# Patient Record
Sex: MALE | Race: AMERICAN INDIAN OR ALASKA NATIVE
[De-identification: names, ages, dates, MRNs, and addresses within clinical notes are randomized per-mention and may not be internally consistent; named-entity substitution may affect disease eponyms.]

---

## 2017-05-04 ENCOUNTER — HOSPITAL ENCOUNTER (EMERGENCY)
Dept: HOSPITAL 5 - ED | Age: 47
Discharge: LEFT BEFORE DISCHARGE | End: 2017-05-04
Payer: SELF-PAY

## 2017-05-04 VITALS — SYSTOLIC BLOOD PRESSURE: 150 MMHG | DIASTOLIC BLOOD PRESSURE: 96 MMHG

## 2017-05-04 DIAGNOSIS — Z53.21: ICD-10-CM

## 2017-05-04 DIAGNOSIS — R10.9: Primary | ICD-10-CM

## 2017-05-04 LAB
ALBUMIN SERPL-MCNC: 3.8 G/DL (ref 3.9–5)
ALBUMIN/GLOB SERPL: 1.1 %
ALP SERPL-CCNC: 95 UNITS/L (ref 35–129)
ALT SERPL-CCNC: 10 UNITS/L (ref 7–56)
ANION GAP SERPL CALC-SCNC: 17 MMOL/L
BASOPHILS NFR BLD AUTO: 0.3 % (ref 0–1.8)
BILIRUB SERPL-MCNC: 0.2 MG/DL (ref 0.1–1.2)
BILIRUB UR QL STRIP: (no result)
BLOOD UR QL VISUAL: (no result)
BUN SERPL-MCNC: 14 MG/DL (ref 9–20)
BUN/CREAT SERPL: 20 %
CALCIUM SERPL-MCNC: 8.8 MG/DL (ref 8.4–10.2)
CHLORIDE SERPL-SCNC: 93.8 MMOL/L (ref 98–107)
CO2 SERPL-SCNC: 24 MMOL/L (ref 22–30)
EOSINOPHIL NFR BLD AUTO: 1.1 % (ref 0–4.3)
GLUCOSE SERPL-MCNC: 343 MG/DL (ref 75–100)
HCT VFR BLD CALC: 42.8 % (ref 35.5–45.6)
HGB BLD-MCNC: 14 GM/DL (ref 11.8–15.2)
KETONES UR STRIP-MCNC: (no result) MG/DL
LEUKOCYTE ESTERASE UR QL STRIP: (no result)
LIPASE SERPL-CCNC: 17 UNITS/L (ref 13–60)
MCH RBC QN AUTO: 30 PG (ref 28–32)
MCHC RBC AUTO-ENTMCNC: 33 % (ref 32–34)
MCV RBC AUTO: 91 FL (ref 84–94)
NITRITE UR QL STRIP: (no result)
PH UR STRIP: 5 [PH] (ref 5–7)
PLATELET # BLD: 245 K/MM3 (ref 140–440)
POTASSIUM SERPL-SCNC: 4.2 MMOL/L (ref 3.6–5)
PROT SERPL-MCNC: 7.4 G/DL (ref 6.3–8.2)
PROT UR STRIP-MCNC: (no result) MG/DL
RBC # BLD AUTO: 4.69 M/MM3 (ref 3.65–5.03)
RBC #/AREA URNS HPF: < 1 /HPF (ref 0–6)
SODIUM SERPL-SCNC: 131 MMOL/L (ref 137–145)
UROBILINOGEN UR-MCNC: < 2 MG/DL (ref ?–2)
WBC # BLD AUTO: 12.5 K/MM3 (ref 4.5–11)
WBC #/AREA URNS HPF: 2 /HPF (ref 0–6)

## 2017-05-04 PROCEDURE — 81001 URINALYSIS AUTO W/SCOPE: CPT

## 2017-05-04 PROCEDURE — 36415 COLL VENOUS BLD VENIPUNCTURE: CPT

## 2017-05-04 PROCEDURE — 83690 ASSAY OF LIPASE: CPT

## 2017-05-04 PROCEDURE — 85025 COMPLETE CBC W/AUTO DIFF WBC: CPT

## 2017-05-04 PROCEDURE — 80053 COMPREHEN METABOLIC PANEL: CPT

## 2017-05-05 NOTE — ED ELOPEMENT REVIEW
ED Pt Elopement review





- Results review


Lab results: 





 Laboratory Tests











  05/04/17 05/04/17 05/04/17





  05:50 05:50 07:11


 


WBC  12.5 H  


 


RBC  4.69  


 


Hgb  14.0  


 


Hct  42.8  


 


MCV  91  


 


MCH  30  


 


MCHC  33  


 


RDW  13.2  


 


Plt Count  245  


 


Lymph % (Auto)  15.9  


 


Mono % (Auto)  8.0 H  


 


Eos % (Auto)  1.1  


 


Baso % (Auto)  0.3  


 


Lymph #  2.0  


 


Mono #  1.0 H  


 


Eos #  0.1  


 


Baso #  0.0  


 


Seg Neutrophils %  74.7 H  


 


Seg Neutrophils #  9.4 H  


 


Sodium   131 L 


 


Potassium   4.2 


 


Chloride   93.8 L 


 


Carbon Dioxide   24 


 


Anion Gap   17 


 


BUN   14 


 


Creatinine   0.7 L 


 


Estimated GFR   > 60 


 


BUN/Creatinine Ratio   20.00 


 


Glucose   343 H 


 


Calcium   8.8 


 


Total Bilirubin   0.20 


 


AST   10 


 


ALT   10 


 


Alkaline Phosphatase   95 


 


Total Protein   7.4 


 


Albumin   3.8 L 


 


Albumin/Globulin Ratio   1.1 


 


Lipase   17 


 


Urine Color    Yellow


 


Urine Turbidity    Clear


 


Urine pH    5.0


 


Ur Specific Gravity    1.029


 


Urine Protein    <15 mg/dl


 


Urine Glucose (UA)    >=500


 


Urine Ketones    Neg


 


Urine Blood    Neg


 


Urine Nitrite    Neg


 


Urine Bilirubin    Neg


 


Urine Urobilinogen    < 2.0


 


Ur Leukocyte Esterase    Neg


 


Urine WBC (Auto)    2.0


 


Urine RBC (Auto)    < 1.0


 


U Epithel Cells (Auto)    1.0


 


Urine Mucus    Few














- Call Back decision


Pt Call Back Decision: No action required

## 2017-10-04 ENCOUNTER — HOSPITAL ENCOUNTER (EMERGENCY)
Dept: HOSPITAL 5 - ED | Age: 47
LOS: 2 days | Discharge: HOME | End: 2017-10-06
Payer: OTHER GOVERNMENT

## 2017-10-04 DIAGNOSIS — I10: ICD-10-CM

## 2017-10-04 DIAGNOSIS — F39: Primary | ICD-10-CM

## 2017-10-04 DIAGNOSIS — E11.9: ICD-10-CM

## 2017-10-04 DIAGNOSIS — F43.10: ICD-10-CM

## 2017-10-04 DIAGNOSIS — F17.200: ICD-10-CM

## 2017-10-04 DIAGNOSIS — M19.90: ICD-10-CM

## 2017-10-04 LAB
ALBUMIN SERPL-MCNC: 3.8 G/DL (ref 3.9–5)
ALBUMIN SERPL-MCNC: 3.9 G/DL (ref 3.9–5)
ALBUMIN/GLOB SERPL: 1.1 %
ALBUMIN/GLOB SERPL: 1.1 %
ALP SERPL-CCNC: 73 UNITS/L (ref 35–129)
ALP SERPL-CCNC: 73 UNITS/L (ref 35–129)
ALT SERPL-CCNC: 6 UNITS/L (ref 7–56)
ALT SERPL-CCNC: 7 UNITS/L (ref 7–56)
ANION GAP SERPL CALC-SCNC: 19 MMOL/L
ANION GAP SERPL CALC-SCNC: 21 MMOL/L
APTT BLD: 22.9 SEC. (ref 24.2–36.6)
BACTERIA #/AREA URNS HPF: (no result) /HPF
BASOPHILS NFR BLD AUTO: 0.7 % (ref 0–1.8)
BILIRUB SERPL-MCNC: 0.3 MG/DL (ref 0.1–1.2)
BILIRUB SERPL-MCNC: 0.4 MG/DL (ref 0.1–1.2)
BILIRUB UR QL STRIP: (no result)
BLOOD UR QL VISUAL: (no result)
BUN SERPL-MCNC: 6 MG/DL (ref 9–20)
BUN SERPL-MCNC: 6 MG/DL (ref 9–20)
BUN/CREAT SERPL: 7 %
BUN/CREAT SERPL: 8 %
CALCIUM SERPL-MCNC: 8.9 MG/DL (ref 8.4–10.2)
CALCIUM SERPL-MCNC: 9.1 MG/DL (ref 8.4–10.2)
CHLORIDE SERPL-SCNC: 101.8 MMOL/L (ref 98–107)
CHLORIDE SERPL-SCNC: 102.3 MMOL/L (ref 98–107)
CK SERPL-CCNC: 180 UNITS/L (ref 55–170)
CO2 SERPL-SCNC: 21 MMOL/L (ref 22–30)
CO2 SERPL-SCNC: 24 MMOL/L (ref 22–30)
EOSINOPHIL NFR BLD AUTO: 3.9 % (ref 0–4.3)
GLUCOSE SERPL-MCNC: 165 MG/DL (ref 75–100)
GLUCOSE SERPL-MCNC: 166 MG/DL (ref 75–100)
HCT VFR BLD CALC: 39.4 % (ref 35.5–45.6)
HGB BLD-MCNC: 13 GM/DL (ref 11.8–15.2)
INR PPP: 1 (ref 0.87–1.13)
KETONES UR STRIP-MCNC: (no result) MG/DL
LEUKOCYTE ESTERASE UR QL STRIP: (no result)
MAGNESIUM SERPL-MCNC: 2.4 MG/DL (ref 1.7–2.3)
MCH RBC QN AUTO: 29 PG (ref 28–32)
MCHC RBC AUTO-ENTMCNC: 33 % (ref 32–34)
MCV RBC AUTO: 89 FL (ref 84–94)
MUCOUS THREADS #/AREA URNS HPF: (no result) /HPF
NITRITE UR QL STRIP: (no result)
PH UR STRIP: 6 [PH] (ref 5–7)
PLATELET # BLD: 351 K/MM3 (ref 140–440)
POTASSIUM SERPL-SCNC: 3.8 MMOL/L (ref 3.6–5)
POTASSIUM SERPL-SCNC: 3.8 MMOL/L (ref 3.6–5)
PROT SERPL-MCNC: 7.3 G/DL (ref 6.3–8.2)
PROT SERPL-MCNC: 7.3 G/DL (ref 6.3–8.2)
PROT UR STRIP-MCNC: (no result) MG/DL
RBC # BLD AUTO: 4.43 M/MM3 (ref 3.65–5.03)
RBC #/AREA URNS HPF: 1 /HPF (ref 0–6)
SODIUM SERPL-SCNC: 140 MMOL/L (ref 137–145)
SODIUM SERPL-SCNC: 141 MMOL/L (ref 137–145)
URINE DRUGS OF ABUSE NOTE: (no result)
UROBILINOGEN UR-MCNC: < 2 MG/DL (ref ?–2)
WBC # BLD AUTO: 6.8 K/MM3 (ref 4.5–11)
WBC #/AREA URNS HPF: 3 /HPF (ref 0–6)

## 2017-10-04 PROCEDURE — 84484 ASSAY OF TROPONIN QUANT: CPT

## 2017-10-04 PROCEDURE — 85610 PROTHROMBIN TIME: CPT

## 2017-10-04 PROCEDURE — 74177 CT ABD & PELVIS W/CONTRAST: CPT

## 2017-10-04 PROCEDURE — 82140 ASSAY OF AMMONIA: CPT

## 2017-10-04 PROCEDURE — 80053 COMPREHEN METABOLIC PANEL: CPT

## 2017-10-04 PROCEDURE — 72125 CT NECK SPINE W/O DYE: CPT

## 2017-10-04 PROCEDURE — 96360 HYDRATION IV INFUSION INIT: CPT

## 2017-10-04 PROCEDURE — 85025 COMPLETE CBC W/AUTO DIFF WBC: CPT

## 2017-10-04 PROCEDURE — 36415 COLL VENOUS BLD VENIPUNCTURE: CPT

## 2017-10-04 PROCEDURE — 80320 DRUG SCREEN QUANTALCOHOLS: CPT

## 2017-10-04 PROCEDURE — 85730 THROMBOPLASTIN TIME PARTIAL: CPT

## 2017-10-04 PROCEDURE — 70450 CT HEAD/BRAIN W/O DYE: CPT

## 2017-10-04 PROCEDURE — 82962 GLUCOSE BLOOD TEST: CPT

## 2017-10-04 PROCEDURE — 81001 URINALYSIS AUTO W/SCOPE: CPT

## 2017-10-04 PROCEDURE — 82550 ASSAY OF CK (CPK): CPT

## 2017-10-04 PROCEDURE — 71010: CPT

## 2017-10-04 PROCEDURE — 99285 EMERGENCY DEPT VISIT HI MDM: CPT

## 2017-10-04 PROCEDURE — 93005 ELECTROCARDIOGRAM TRACING: CPT

## 2017-10-04 PROCEDURE — 93010 ELECTROCARDIOGRAM REPORT: CPT

## 2017-10-04 PROCEDURE — 80307 DRUG TEST PRSMV CHEM ANLYZR: CPT

## 2017-10-04 PROCEDURE — 74176 CT ABD & PELVIS W/O CONTRAST: CPT

## 2017-10-04 PROCEDURE — 83735 ASSAY OF MAGNESIUM: CPT

## 2017-10-04 PROCEDURE — G0480 DRUG TEST DEF 1-7 CLASSES: HCPCS

## 2017-10-04 PROCEDURE — 84443 ASSAY THYROID STIM HORMONE: CPT

## 2017-10-04 NOTE — CAT SCAN REPORT
CT of cervical spine without contrast.



History: Neck pain after trauma.



Findings: There is no evidence of fracture or subluxation. Multilevel 

spondylosis is present with extensive anterior osteophytes from C4-C7. 

The posterior elements are normal. The odontoid is intact.



Impression: No acute findings.

## 2017-10-04 NOTE — CAT SCAN REPORT
CT of the abdomen and pelvis without contrast.



History: Abdominal trauma.



Findings: The technical quality of the examination especially regarding 

the upper abdominal viscera is suboptimal due to metallic streak 

artifact from an external metallic device in the patient's anterior 

skin surface. Noting these limitations, no definite abnormalities of 

the liver or spleen are seen. No subcapsular fluid collection is 

identified. The pancreas is unremarkable. There is a large left 

intrarenal stone centrally measuring 1.6 cm in maximum dimension. There 

is no evidence of hydronephrosis. There is a hypodense mass arising 

from the lateral aspect of the left kidney measuring 3.2 cm in 

diameter. This may represent a simple cyst but is difficult to assess 

accurately without intravenous contrast and due to the streak artifacts 

described above.



There is no evidence of retroperitoneal hemorrhage. Surgical clips are 

seen in the patient's midline.



No pelvic masses or abnormal fluid collections are seen. Thickening of 

Gerota's fascia is seen on the left. Coalescent density in the left 

paracolic gutter may represent fluid filled bowel. A Small fluid 

collection in this area cannot entirely be excluded.



No free air is identified. No pelvic masses are seen.



Impression: 1. Large left renal stone without hydronephrosis.



2. Hypodense left renal mass probably representing a cyst. Please see 

above described technical limitations.



3. Mild thickening of left Gerota's fascia with coalescent density in 

the left paracolic gutter. This are present fluid-filled bowel or a 

small fluid collection.

## 2017-10-04 NOTE — EMERGENCY DEPARTMENT REPORT
Blank Doc





- Documentation


Documentation: 





I was asked to follow up on this patient's repeat CT scan of the abdomen and 

pelvis, this time with by mouth and IV contrast, and hopes of medically 

clearing this patient.  The CT report was read as left mild degree paracolic 

gutter fluid and that loculation cannot be ruled out.  However the patient does 

not complain of any abdominal pain.  There is no sign of any surrounding 

erythema or purulent discharge around the incision site where he has staples.  

Vital signs are stable including being afebrile.  There is no leukocytosis.  I 

spoke to Dr. Jeff, the partner of Dr. Seals who did the colostomy reversal 

on 9/20/17 at [facility].  Dr. Jeff says that it is not unusual for 

there to be some residual fluid in that paracolic gutter region as that is the 

area where it would accumulate, where it is a drainage site and secondary to 

the materials they used for the surgery.  He says that in absence of symptoms 

and with the normal-appearing labs and vitals, that he is not concerned for any 

infection or postop complication.  At this point, based on the rest of the 

evaluation done by Dr. Ross, and as there is nothing else that I was asked 

to follow up on regarding medical clearance, the patient appears to be 

medically cleared for psychiatric placement.  The 1013 was previously filled 

out by Dr Ross.

## 2017-10-04 NOTE — CAT SCAN REPORT
CRANIAL CT SCAN:



History: Altered mental status.

Serial contiguous axial images were obtained through the cranium.

Intravenous contrast material was not administered.  The ventricles are 

normal in size and appearance.  There is no mass effect or midline 

shift.  No areas of abnormally increased or decreased attenuation are 

seen.  No mass lesion is seen.



The mastoid air cells and visualized portions of the sinuses are

normal.



IMPRESSION:

Cranial CT scan within normal limits.

## 2017-10-04 NOTE — EMERGENCY DEPARTMENT REPORT
ED General Adult HPI





- General


Chief complaint: Altered Mental Status


Stated complaint: AMS


Time Seen by Provider: 10/04/17 13:28


Source: EMS (verbal report received from EMS.ems notes not available at time of

  chart dictation), RN notes reviewed


Limitations: Altered Mental Status, Physical Limitation





- History of Present Illness


Initial comments: 





This is a 47-year-old male, the patient is previously unknown to this provider.

  EMS reports a past medical history of PTSD.  They report that the patient 

[facility]; they believe that 

the patient had a colectomy/colostomy bag removal.





EMS indicates that they were contacted for agitated behavior.  They indicate 

that the patient was quite combative, and required many people to restrain and 

hold him down in the field.  They verbally reported that they gave Benadryl, 

Versed, and Haldol prior to arrival.  Upon arrival to the ER, the patient 

sedated, is arousable, protecting his airway, and in no acute distress.  Family 

is unavailable at this time for collateral information.


Unable to ascertain information about exacerbating or relieving factors, 

radiation, etc.











-: unknown


Radiation: other (as per history of present illness)


Consistency: other (as per history of present illness)


Improves with: other (as per history of present illness)


Worsens with: other (as per history of present illness)


Associated Symptoms: other (as per history of present illness)





- Related Data


 Home Medications











 Medication  Instructions  Recorded  Confirmed  Last Taken


 


Sertraline 200 mg PO DAILY 05/04/17 05/04/17 Unknown


 


amLODIPine 10 mg PO DAILY 05/04/17 05/04/17 Unknown


 


glipiZIDE 5 mg PO TID 05/04/17 05/04/17 Unknown


 


metFORMIN 1,000 mg PO TID 05/04/17 05/04/17 Unknown











 Allergies











Allergy/AdvReac Type Severity Reaction Status Date / Time


 


No Known Allergies Allergy   Unverified 05/04/17 05:29














ED Review of Systems


ROS: 


Stated complaint: AMS


Other details as noted in HPI





Comment: Unobtainable due to pts medical conditions





ED Past Medical Hx





- Past Medical History


Hx Hypertension: Yes


Hx Diabetes: Yes


Hx Arthritis: Yes


Additional medical history: PTSD,





- Surgical History


Additional Surgical History: Right Ankle. Right hip.





- Social History


Smoking Status: Current Every Day Smoker


Substance Use Type: None





- Medications


Home Medications: 


 Home Medications











 Medication  Instructions  Recorded  Confirmed  Last Taken  Type


 


Sertraline 200 mg PO DAILY 05/04/17 05/04/17 Unknown History


 


amLODIPine 10 mg PO DAILY 05/04/17 05/04/17 Unknown History


 


glipiZIDE 5 mg PO TID 05/04/17 05/04/17 Unknown History


 


metFORMIN 1,000 mg PO TID 05/04/17 05/04/17 Unknown History














ED Physical Exam





- General


Limitations: Altered Mental Status, Physical Limitation


General appearance: lethargic





- Head


Head exam: Present: atraumatic, normocephalic





- Eye


Eye exam: Present: normal appearance, PERRL





- ENT


ENT exam: Present: normal exam, normal orophraynx, mucous membranes moist, TM's 

normal bilaterally, normal external ear exam, other (there is no hemotympanum)





- Neck


Neck exam: Present: normal inspection.  Absent: tenderness, meningismus





- Respiratory


Respiratory exam: Present: normal lung sounds bilaterally.  Absent: respiratory 

distress, wheezes, rales, rhonchi, stridor, chest wall tenderness, accessory 

muscle use, decreased breath sounds, prolonged expiratory





- Cardiovascular


Cardiovascular Exam: Present: normal rhythm, tachycardia, normal heart sounds.  

Absent: systolic murmur, diastolic murmur, rubs, gallop





- GI/Abdominal


GI/Abdominal exam: Present: soft, normal bowel sounds, other (multiple staples 

noted on the abdominal wall.  No redness, pus or streaking.  There is no 

tenderness).  Absent: distended, tenderness, guarding, rebound, rigid, 

pulsatile mass





- Rectal


Rectal exam: Present: normal inspection





- 


 exam: Present: normal inspection


External exam: Present: normal external exam





- Extremities Exam


Extremities exam: Present: normal inspection, normal capillary refill, other (

there is no clonus.  The compartments are soft.  There is no long bony step-

offs.  2+ pulses noted in the bilateral upper and lower extremities).  Absent: 

calf tenderness





- Back Exam


Back exam: Present: normal inspection.  Absent: tenderness, CVA tenderness (R), 

paraspinal tenderness





- Neurological Exam


Neurological exam: Present: altered





- Psychiatric


Psychiatric exam: Present: other (patient is currently nonverbal)





- Skin


Skin exam: Present: warm, dry, intact, normal color.  Absent: rash





ED Course


 Vital Signs











  10/04/17 10/04/17





  13:36 14:08


 


Temperature 99.3 F 


 


Pulse Rate 103 H 


 


Respiratory 18 18





Rate  


 


Blood Pressure 142/86 


 


O2 Sat by Pulse 99 100





Oximetry  














- Reevaluation(s)


Reevaluation #1: 





10/04/17 13:49


Differential diagnosis: Intracranial injury, cervical spine injury, mood 

disorder, electrolyte derangement, intra-abdominal injury





Assessment and plan: 47-year-old male who is currently sedated after episode of 

agitated behavior.





He does not have hyperreflexia, there is no clonus, and he does not have a 

fever.


We will check CT scan of the brain and cervical spine, laboratory studies, 

urinalysis, chest x-ray, noncontrast CT scan of the abdomen and pelvis, and 

wait for the patient to wake up in order to obtain better history.


Reevaluation #2: 





10/04/17 15:12


Additional history obtained from patient's fiance, Miss Fay Shah; [phone]





She reports that the patient was quite agitated earlier on today, and is 

currently dependent on "rocks", crack and cocaine.  Apparently, the patient 

endorses suicidality, and was quite agitated and belligerent, and this is why


911 was contacted.





She reports that the patient had a distant history of diverticulitis, which 

required colostomy, which was reversed 2 weeks ago at [facility], by Dr. Seals.





Patient is placed on a 1013 at this time.  A crisis consult has been ordered/

requested.


Reevaluation #3: 





10/04/17 15:26


Noncontrast CT scan of the brain and cervical spine negative.  CT scan of the 

abdomen/pelvis with nonspecific findings.  Patient is still sleepy, plan to 

repeat with IV and oral contrast.  If abnormal findings persist on repeat CT 

scan with better delineation, will discuss with the patient's surgeon at 

[facility], Dr. Seals.








[facility] Surgeon   Teodoro Seals M.D.


[facility]   


Appendectomy, Breast Surgery, Cancer Surgery, Colon Surgery, Gallbladder Surgery

, Hernia Surgery, Laparoscopic Surgery, Melanoma Surgery, Parathyroid Surgery, 

Rectal Surgery, Reflux Surgery, Spleen Surgery, Stomach Surgery, Thyroid Surgery





Board Certification   August 2013


Hospitals   [facility] 7/2008-8/2013


Practice Name   [facility]


Phone   [phone]  Fax [phone]


Office(s)   


[facility] [address]


10/04/17 15:28





Reevaluation #4: 





10/04/17 15:43


Care transferred to the oncoming ER physician, Dr. Snow, to follow-up CT scan 

of the abdomen and pelvis with IV and oral contrast.  Patient still somnolent 

and sedated, therefore he cannot tolerate oral contrast, we will have to wait 

for him to wake up.





ED Medical Decision Making





- Lab Data


Result diagrams: 


 10/04/17 13:57





 10/04/17 13:57








 Vital Signs











  10/04/17





  13:36


 


Temperature 99.3 F


 


Pulse Rate 103 H


 


Respiratory 18





Rate 


 


Blood Pressure 142/86


 


O2 Sat by Pulse 99





Oximetry 














- EKG Data





10/04/17 13:51


Sinus tachycardia, 104 bpm, left axis deviation, atrial enlargement, not 

morphologically consistent with STEMI, QTc prolonged period there is no prior 

for comparison at this time.





- Radiology Data


Radiology results: pending


Critical care attestation.: 


If time is entered above; I have spent that time in minutes in the direct care 

of this critically ill patient, excluding procedure time.








ED Disposition


Clinical Impression: 


 Mood disorder





Disposition: DC/TX-[id number] PSY HOSP/PSY UNIT


Is pt being admited?: No


Does the pt Need Aspirin: No


Condition: Good


Referrals: 


PRIMARY CARE,MD [Primary Care Provider] - 3-5 Days

## 2017-10-04 NOTE — XRAY REPORT
AP CHEST:



HISTORY: Altered mental status



AP view of the chest demonstrates a normal mediastinal and

cardiac contour with clear lungs and normal bony and soft tissue

structures.



IMPRESSION:

Unremarkable AP chest.

## 2017-10-04 NOTE — CAT SCAN REPORT
FINAL REPORT



PROCEDURE:  CT ABDOMEN PELVIS W CON



TECHNIQUE:  Computerized axial tomography of the abdomen and

pelvis was performed after the IV injection of iodinated nonionic

contrast. 



HISTORY:  iv and oral 



COMPARISON:  10/04/2017



FINDINGS:  

Liver, spleen, and adrenal glands are within normal limits. There

are multiple simple cysts involving bilateral kidneys largest

measuring 2.4 x 3.3 centimeters located in the midpole left

kidney. 8 millimeter x 13 millimeter nonobstructive calculus is

noted in the left lower collecting system. 2.2 x 2.8 centimeter

septated cystic lesion involving the lower pole right kidney

demonstrates a ill-defined areas of enhancement. In a similar

fashion a 1.3 centimeters cystic lesion of the midpole right

kidney demonstrates nodular enhancement along medial aspect.

There is no hydronephrosis. Urinary bladder is well distended

with normal outlines. There is no free air. Gallbladder is

unremarkable. Aorta is of normal caliber. Small bowel loops are

within normal limits. There is evidence of prior colonic surgery.

Mild degree fluid is noted in the left paracolic gutter.

Extensive degenerative changes are noted involving bilateral hip

joints with deformities of femoral heads and acetabula. 



IMPRESSION:  

Partially enhancing cystic lesions are noted in the right kidney.

Right renal neoplasms cannot be excluded. Biopsy is recommended.



Nonobstructive left renal calculus.



Mild degree left paracolic gutter fluid. Loculations of this

fluid cannot be excluded.

## 2017-10-05 VITALS — DIASTOLIC BLOOD PRESSURE: 88 MMHG | SYSTOLIC BLOOD PRESSURE: 143 MMHG

## 2017-10-05 RX ADMIN — AMLODIPINE BESYLATE SCH: 10 TABLET ORAL at 16:08

## 2017-10-05 RX ADMIN — SERTRALINE SCH MG: 100 TABLET, FILM COATED ORAL at 16:08

## 2017-10-06 RX ADMIN — AMLODIPINE BESYLATE SCH MG: 10 TABLET ORAL at 10:35

## 2017-10-06 RX ADMIN — SERTRALINE SCH MG: 100 TABLET, FILM COATED ORAL at 10:35

## 2017-10-06 NOTE — CONSULTATION
History of Present Illness





- Reason for Consult


Consult date: 10/06/17


Reason for consult: Mental Health Evaluation


Requesting physician: NANO MOORE





- Chief Complaint


Chief complaint: 


"I was upset"








- History of Present Psychiatric Illness


47 y.o AA male presenting to [facility] for agitated behavior. Today patient is calm 

and cooperative during the assessment. He stated that he got upset with his 

girlfriend and became irritated, so he left the house and cannot remember 

anything else. He did state that he started drinking alcohol once he left his 

home. He stated that he been drinking alcohol over 20+ years. He stated that he 

drink (etoh) everyday. Per the patient, this is the first time in years that he 

have not had a drink in 2 straight days. He stated that he was "drunk" when he 

arrived at this facility. He stated experiencing "blacking out" from excessive 

drinking. He stated that he drink to mask his depression. He stated that he 

suffer from PTSD, because of trauma like experiences while in the . He 

stated that he struggle with feeling sad, hopeless, and helpless, but can't 

explain why. He denies SI/HI's and AVH's. He rate his depression 5/10, with 10 

being the worse. He reports sleep disturbances prior to his admission, but 

denies a poor appetite. He denies recreational drug use, but positive for 

cocaine. Also, patient positive for benzos. He stated that his major issue is 

alcohol addiction. The patient is willing to attend rehab services once 

discharged. He stated not being compliant with his medication for 2 weeks prior 

to his admission to [facility]. 








Medications and Allergies


 Allergies











Allergy/AdvReac Type Severity Reaction Status Date / Time


 


No Known Allergies Allergy   Unverified 05/04/17 05:29











 Home Medications











 Medication  Instructions  Recorded  Confirmed  Last Taken  Type


 


Gabapentin [Neurontin] 400 mg PO QHS 10/04/17 10/04/17 Unknown History


 


Metformin HCl [Metformin HCl ER] 1,000 mg PO BID 10/04/17 10/04/17 Unknown 

History


 


Mirtazapine [Remeron] 30 mg PO QHS 10/04/17 10/04/17 Unknown History


 


Morphine ER [Ms Contin ER] 30 mg PO Q12H 10/04/17 10/04/17 Unknown History


 


Oxycodone HCl [oxyCODONE TAB] 10 mg PO Q4H PRN 10/04/17 10/04/17 Unknown History


 


Sertraline [Zoloft] 200 mg PO QDAY 10/04/17 10/04/17 Unknown History


 


amLODIPine [Norvasc] 10 mg PO QDAY 10/04/17 10/04/17 Unknown History


 


glipiZIDE [Glucotrol] 5 mg PO QDAY 10/04/17 10/04/17 Unknown History


 


traZODone [Desyrel] 200 mg PO QHS 10/04/17 10/04/17 Unknown History











Active Meds: 


Active Medications





Acetaminophen (Tylenol)  650 mg PO Q6HR PRN


   PRN Reason: Pain


Amlodipine Besylate (Norvasc)  10 mg PO QDAY [facility]


   Last Admin: 10/06/17 10:35 Dose:  10 mg


Gabapentin (Neurontin)  400 mg PO QHS [facility]


   Last Admin: 10/05/17 22:05 Dose:  400 mg


Haloperidol Lactate (Haldol)  5 mg IM Q6HR PRN


   PRN Reason: Agitation


Ibuprofen (Motrin)  600 mg PO Q6HR PRN


   PRN Reason: Pain


Lorazepam (Ativan)  2 mg IM Q4HR PRN


   PRN Reason: Agitation


Mirtazapine (Remeron)  30 mg PO QHS [facility]


   Last Admin: 10/05/17 22:05 Dose:  30 mg


Ondansetron HCl (Zofran Odt)  4 mg PO Q6HR PRN


   PRN Reason: Nausea


Sertraline HCl (Zoloft)  200 mg PO QDAY [facility]


   Last Admin: 10/06/17 10:35 Dose:  200 mg


Trazodone HCl (Desyrel)  200 mg PO QHS [facility]


   Last Admin: 10/05/17 22:05 Dose:  200 mg











Past psychiatric history





- Past Medical History


Past Medical History: hypertension


Past Surgical History: Other (Abdomen Surgery)





- past Psychiatric treatment and history


Psych: Depression


psychiatric treatment history: 


[facility]. He 

denies a fam psy hx.








- Social History


Social history: other ([employer] Graduate)





Mental Status Exam





- Vital signs


 Last Vital Signs











Temp  98.4 F   10/05/17 20:11


 


Pulse  87   10/06/17 10:35


 


Resp  18   10/06/17 12:45


 


BP  143/88   10/06/17 10:35


 


Pulse Ox  99   10/06/17 12:45














- Exam


Narrative exam: 


MSE:





 Appearance: calm, cooperative


 Behavior: regular eye contact


 Speech: regular rate and tone


 Mood: "I am okay"


 Affect: congruent to mood


 Thought Process: linear


 Thought Content: denies SI/HI's and AVH's


 Motor Activity: ambulatory


 Cognition: A/O x 3


 Insight: fair


 Judgment: fair








Results


Result Diagrams: 


 10/04/17 13:57





 10/04/17 13:57


 Abnormal lab results











  10/05/17 Range/Units





  11:02 


 


POC Glucose  265 H  ()  








All other labs normal.








Assessment and Plan


Assessment and plan: 


Impression: Historical Dx: Depression/PTSD. Unspecified Mood DO. Alcohol Use 

DO. Substance Use DO (cocaine). Today patient is calm and cooperative during 

the assessment. Positive for cocaine. Patient is no threat to others.





DDx: R/O MDD, R/O Bipolar, Alcohol Induced Mood DO





Recommendation/Plan: Rescind 1013. Continue Zoloft 200 mg PO Daily for 

depression and Remeron 15 mg PO HS for sleep consolidation. Patient stated that 

[facility]. Also, 

patient given outpatient/rehab services for [facility]. Discussed possible 

suicidality/medication induced suyapa with patient reference antidepressants.

## 2020-07-21 ENCOUNTER — HOSPITAL ENCOUNTER (EMERGENCY)
Dept: HOSPITAL 5 - ED | Age: 50
Discharge: HOME | End: 2020-07-21
Payer: OTHER GOVERNMENT

## 2020-07-21 VITALS — DIASTOLIC BLOOD PRESSURE: 82 MMHG | SYSTOLIC BLOOD PRESSURE: 132 MMHG

## 2020-07-21 DIAGNOSIS — F17.200: ICD-10-CM

## 2020-07-21 DIAGNOSIS — Z79.899: ICD-10-CM

## 2020-07-21 DIAGNOSIS — R07.89: ICD-10-CM

## 2020-07-21 DIAGNOSIS — E11.65: Primary | ICD-10-CM

## 2020-07-21 DIAGNOSIS — I10: ICD-10-CM

## 2020-07-21 DIAGNOSIS — F43.10: ICD-10-CM

## 2020-07-21 DIAGNOSIS — M19.90: ICD-10-CM

## 2020-07-21 DIAGNOSIS — F99: ICD-10-CM

## 2020-07-21 LAB
ALBUMIN SERPL-MCNC: 3.8 G/DL (ref 3.9–5)
ALT SERPL-CCNC: 13 UNITS/L (ref 7–56)
APTT BLD: 24.3 SEC. (ref 24.2–36.6)
BASOPHILS # (AUTO): 0 K/MM3 (ref 0–0.1)
BASOPHILS NFR BLD AUTO: 0.5 % (ref 0–1.8)
BILIRUB DIRECT SERPL-MCNC: < 0.2 MG/DL (ref 0–0.2)
BUN SERPL-MCNC: 8 MG/DL (ref 9–20)
BUN/CREAT SERPL: 9 %
CALCIUM SERPL-MCNC: 8.8 MG/DL (ref 8.4–10.2)
EOSINOPHIL # BLD AUTO: 0.1 K/MM3 (ref 0–0.4)
EOSINOPHIL NFR BLD AUTO: 2 % (ref 0–4.3)
HCT VFR BLD CALC: 45.2 % (ref 35.5–45.6)
HEMOLYSIS INDEX: 2
HGB BLD-MCNC: 15 GM/DL (ref 11.8–15.2)
INR PPP: 0.9 (ref 0.87–1.13)
LYMPHOCYTES # BLD AUTO: 1.6 K/MM3 (ref 1.2–5.4)
LYMPHOCYTES NFR BLD AUTO: 30.5 % (ref 13.4–35)
MCHC RBC AUTO-ENTMCNC: 33 % (ref 32–34)
MCV RBC AUTO: 97 FL (ref 84–94)
MONOCYTES # (AUTO): 0.5 K/MM3 (ref 0–0.8)
MONOCYTES % (AUTO): 8.9 % (ref 0–7.3)
PLATELET # BLD: 152 K/MM3 (ref 140–440)
RBC # BLD AUTO: 4.68 M/MM3 (ref 3.65–5.03)

## 2020-07-21 PROCEDURE — 36415 COLL VENOUS BLD VENIPUNCTURE: CPT

## 2020-07-21 PROCEDURE — 85025 COMPLETE CBC W/AUTO DIFF WBC: CPT

## 2020-07-21 PROCEDURE — 80076 HEPATIC FUNCTION PANEL: CPT

## 2020-07-21 PROCEDURE — 80048 BASIC METABOLIC PNL TOTAL CA: CPT

## 2020-07-21 PROCEDURE — 71045 X-RAY EXAM CHEST 1 VIEW: CPT

## 2020-07-21 PROCEDURE — 96361 HYDRATE IV INFUSION ADD-ON: CPT

## 2020-07-21 PROCEDURE — 93005 ELECTROCARDIOGRAM TRACING: CPT

## 2020-07-21 PROCEDURE — 82962 GLUCOSE BLOOD TEST: CPT

## 2020-07-21 PROCEDURE — 96374 THER/PROPH/DIAG INJ IV PUSH: CPT

## 2020-07-21 PROCEDURE — 83735 ASSAY OF MAGNESIUM: CPT

## 2020-07-21 PROCEDURE — 85730 THROMBOPLASTIN TIME PARTIAL: CPT

## 2020-07-21 PROCEDURE — 96372 THER/PROPH/DIAG INJ SC/IM: CPT

## 2020-07-21 PROCEDURE — 82010 KETONE BODYS QUAN: CPT

## 2020-07-21 PROCEDURE — 83880 ASSAY OF NATRIURETIC PEPTIDE: CPT

## 2020-07-21 PROCEDURE — 84484 ASSAY OF TROPONIN QUANT: CPT

## 2020-07-21 PROCEDURE — 85610 PROTHROMBIN TIME: CPT

## 2020-07-21 PROCEDURE — 99284 EMERGENCY DEPT VISIT MOD MDM: CPT

## 2020-07-21 PROCEDURE — 82553 CREATINE MB FRACTION: CPT

## 2020-07-21 PROCEDURE — 82550 ASSAY OF CK (CPK): CPT

## 2020-07-21 PROCEDURE — 82805 BLOOD GASES W/O2 SATURATION: CPT

## 2020-07-21 NOTE — XRAY REPORT
CHEST 1 VIEW 7/21/2020 1:29 PM



INDICATION / CLINICAL INFORMATION:

Chest Pain.



COMPARISON: 

One view of the chest from 10/4/2017.



FINDINGS:



SUPPORT DEVICES: None.

HEART / MEDIASTINUM: No significant abnormality. 

LUNGS / PLEURA: No significant pulmonary or pleural abnormality. No pneumothorax. 



ADDITIONAL FINDINGS: No significant additional findings.



IMPRESSION:

1. No acute abnormality of the chest.



Signer Name: Butch Perez MD 

Signed: 7/21/2020 1:58 PM

Workstation Name: [id number]

## 2020-07-21 NOTE — EMERGENCY DEPARTMENT REPORT
ED Chest Pain HPI





- General


Chief Complaint: Dyspnea/Respdistress


Stated Complaint: PUI/HYPERGLYCEMIA


Time Seen by Provider: 07/21/20 13:02


Source: EMS


Mode of arrival: Stretcher


Limitations: No Limitations





- History of Present Illness


Initial Comments: 


This is a 50-year-old man who states that his sugars have been out of control.  

He tells me that he called the ambulance because of chest pain and shortness of 

breath.  He stated the chest pain was of less than 1 minute duration.  He states

that he used to be on Lantus at night but he is now taking another medication of

unknown name.  He receives care at the [facility].  He does admit to having a p

sychiatric disorder.  Per review of his previous medical record in 2017:


Assessment and Plan


Assessment and plan: 


Impression: Historical Dx: Depression/PTSD. Unspecified Mood DO. Alcohol Use DO.

Substance Use DO (cocaine). Today patient is calm and cooperative during the 

assessment. Positive for cocaine. Patient is no threat to others.


Despite stating that he call EMS because of chest pain, there is no record of a 

prehospital 12-lead EKG or report from EMS.  The patient states that he has been

admitted to the [facility] within 6 months for evaluation of chest pain having 

studies done as an inpatient which were negative for a heart problem.  He also 

states he has had several stress tests in the past that were negative.  He has a

very strange affect and approach.  He immediately asked me where I am from a 

symptoms I come in the room.  When I told him [place], he was reassured and 

continue to provide historical information.  Medication reconciliation indicates

previous prescription for MS Contin in 2017.


MD Complaint: chest pain


-: minutes(s) (Less than a minute)


Severity scale (0 -10): 9





- Related Data


                                Home Medications











 Medication  Instructions  Recorded  Confirmed  Last Taken


 


Gabapentin 400 mg PO QHS 10/04/17 10/04/17 Unknown


 


Metformin HCl [Metformin HCl ER] 1,000 mg PO BID 10/04/17 10/04/17 Unknown


 


Mirtazapine [Remeron] 30 mg PO QHS 10/04/17 10/04/17 Unknown


 


Morphine ER [Ms Contin ER] 30 mg PO Q12H 10/04/17 10/04/17 Unknown


 


Oxycodone HCl [oxyCODONE] 10 mg PO Q4H PRN 10/04/17 10/04/17 Unknown


 


Sertraline [Zoloft] 200 mg PO QDAY 10/04/17 10/04/17 Unknown


 


amLODIPine 10 mg PO QDAY 10/04/17 10/04/17 Unknown


 


glipiZIDE [Glucotrol] 5 mg PO QDAY 10/04/17 10/04/17 Unknown


 


traZODone [Desyrel] 200 mg PO QHS 10/04/17 10/04/17 Unknown








                                  Previous Rx's











 Medication  Instructions  Recorded  Last Taken  Type


 


Insulin Regular, Human [HumuLIN R] 1 units SQ BID #1 vial 07/21/20 Unknown Rx











                                    Allergies











Allergy/AdvReac Type Severity Reaction Status Date / Time


 


No Known Allergies Allergy   Unverified 05/04/17 05:29














Heart Score





- HEART Score


History: Slightly suspicious


EKG: Non-specific


Age: 45-65


Risk factors: > 3 risk factors or hx of atherosclerotic disease


Troponin: < normal limit


HEART Score: 4





- Critical Actions


Critical Actions: 4-6 pts:12-16.6% risk of adverse cardiac event. Should be 

admitted





ED Review of Systems


ROS: 


Stated complaint: PUI/HYPERGLYCEMIA


Other details as noted in HPI





Constitutional: denies: chills, fever


Eyes: denies: eye pain, eye discharge, vision change


ENT: denies: ear pain, throat pain


Respiratory: denies: cough, shortness of breath, wheezing


Cardiovascular: chest pain.  denies: palpitations


Endocrine: other (States uncontrolled hyperglycemia)


Gastrointestinal: denies: abdominal pain, nausea, diarrhea


Genitourinary: denies: urgency, dysuria


Musculoskeletal: denies: back pain, joint swelling, arthralgia


Skin: denies: rash, lesions


Neurological: denies: headache, weakness, paresthesias


Psychiatric: denies: anxiety, depression


Hematological/Lymphatic: denies: easy bleeding, easy bruising





ED Past Medical Hx





- Past Medical History


Hx Hypertension: Yes


Hx Diabetes: Yes


Hx Arthritis: Yes


Additional medical history: PTSD,





- Surgical History


Additional Surgical History: Right Ankle. Right hip.





- Social History


Smoking Status: Current Every Day Smoker


Substance Use Type: Alcohol





- Medications


Home Medications: 


                                Home Medications











 Medication  Instructions  Recorded  Confirmed  Last Taken  Type


 


Gabapentin 400 mg PO QHS 10/04/17 10/04/17 Unknown History


 


Metformin HCl [Metformin HCl ER] 1,000 mg PO BID 10/04/17 10/04/17 Unknown 

History


 


Mirtazapine [Remeron] 30 mg PO QHS 10/04/17 10/04/17 Unknown History


 


Morphine ER [Ms Contin ER] 30 mg PO Q12H 10/04/17 10/04/17 Unknown History


 


Oxycodone HCl [oxyCODONE] 10 mg PO Q4H PRN 10/04/17 10/04/17 Unknown History


 


Sertraline [Zoloft] 200 mg PO QDAY 10/04/17 10/04/17 Unknown History


 


amLODIPine 10 mg PO QDAY 10/04/17 10/04/17 Unknown History


 


glipiZIDE [Glucotrol] 5 mg PO QDAY 10/04/17 10/04/17 Unknown History


 


traZODone [Desyrel] 200 mg PO QHS 10/04/17 10/04/17 Unknown History


 


Insulin Regular, Human [HumuLIN R] 1 units SQ BID #1 vial 07/21/20  Unknown Rx














ED Physical Exam





- General


Limitations: No Limitations


General appearance: alert, in no apparent distress





- Head


Head exam: Present: atraumatic, normocephalic





- Eye


Eye exam: Present: normal appearance.  Absent: scleral icterus





- ENT


ENT exam: Present: mucous membranes moist





- Neck


Neck exam: Present: normal inspection





- Respiratory


Respiratory exam: Present: normal lung sounds bilaterally.  Absent: respiratory 

distress





- Cardiovascular


Cardiovascular Exam: Present: regular rate, normal rhythm.  Absent: systolic 

murmur, diastolic murmur, rubs, gallop





- GI/Abdominal


GI/Abdominal exam: Present: soft, normal bowel sounds.  Absent: distended, 

tenderness, guarding, rebound, rigid





- Rectal


Rectal exam: Present: deferred





- Extremities Exam


Extremities exam: Present: normal inspection, normal capillary refill.  Absent: 

calf tenderness





- Back Exam


Back exam: Present: normal inspection





- Neurological Exam


Neurological exam: Present: alert, oriented X3, CN II-XII intact.  Absent: motor

sensory deficit





- Psychiatric


Psychiatric exam: Present: normal affect, normal mood





- Skin


Skin exam: Present: warm, dry, intact, normal color.  Absent: rash





ED Course


                                   Vital Signs











  07/21/20 07/21/20 07/21/20





  12:54 13:00 13:15


 


Temperature 98.1 F  98.1 F


 


Pulse Rate 13 L  86


 


Respiratory 14 22 24





Rate   


 


Blood Pressure 172/132  


 


Blood Pressure   165/103





[Right]   


 


O2 Sat by Pulse 97 99 100





Oximetry   














- Reevaluation(s)


Reevaluation #1: 


Patient apparently became very anxious.  I gave him Ativan.  On my reevaluation 

he stated he felt better.  This clearly does have a psychiatric disorder/PTSD.  

He is not in DKA.  He will be given a liter of fluid, IV insulin.  He will be 

placed on a sliding scale.  He is to follow-up with the [facility].  I will also refer him to [facility].


07/21/20 15:46








TEODORA score





- Teodora Score


Age > 65: (0) No


Aspirin use within the Past 7 Days: (0) No


3 or more CAD Risk Factors: (1) Yes


2 or more Angina events in past 24 hrs: (0) No


Known CAD with more than 50% Stenosis: (0) No


Elevated Cardiac Markers: (0) No


ST Deviation Greater than 0.5mm: (0) No


TEODORA Score: 1





ED Medical Decision Making





- Lab Data


Result diagrams: 


                                 07/21/20 14:40





                                 07/21/20 14:40








                         Laboratory Results - last 24 hr











  07/21/20 07/21/20 07/21/20





  14:40 14:40 14:40


 


WBC  5.3  


 


RBC  4.68  


 


Hgb  15.0  


 


Hct  45.2  


 


MCV  97 H  


 


MCH  32  


 


MCHC  33  


 


RDW  13.2  


 


Plt Count  152  


 


Lymph % (Auto)  30.5  


 


Mono % (Auto)  8.9 H  


 


Eos % (Auto)  2.0  


 


Baso % (Auto)  0.5  


 


Lymph #  1.6  


 


Mono #  0.5  


 


Eos #  0.1  


 


Baso #  0.0  


 


Seg Neutrophils %  58.1  


 


Seg Neutrophils #  3.1  


 


PT   12.3 


 


INR   0.90 


 


APTT   24.3 


 


VBG pH   


 


Estimated GFR    > 60


 


BUN/Creatinine Ratio    9


 


Troponin T    < 0.010


 


Albumin/Globulin Ratio   














  07/21/20 07/21/20





  14:40 14:40


 


WBC  


 


RBC  


 


Hgb  


 


Hct  


 


MCV  


 


MCH  


 


MCHC  


 


RDW  


 


Plt Count  


 


Lymph % (Auto)  


 


Mono % (Auto)  


 


Eos % (Auto)  


 


Baso % (Auto)  


 


Lymph #  


 


Mono #  


 


Eos #  


 


Baso #  


 


Seg Neutrophils %  


 


Seg Neutrophils #  


 


PT  


 


INR  


 


APTT  


 


VBG pH   7.415


 


Estimated GFR  


 


BUN/Creatinine Ratio  


 


Troponin T  


 


Albumin/Globulin Ratio  1.2 











                         Laboratory Results - last 24 hr











  07/21/20 07/21/20 07/21/20





  14:40 14:40 14:40


 


WBC  5.3  


 


RBC  4.68  


 


Hgb  15.0  


 


Hct  45.2  


 


MCV  97 H  


 


MCH  32  


 


MCHC  33  


 


RDW  13.2  


 


Plt Count  152  


 


Lymph % (Auto)  30.5  


 


Mono % (Auto)  8.9 H  


 


Eos % (Auto)  2.0  


 


Baso % (Auto)  0.5  


 


Lymph #  1.6  


 


Mono #  0.5  


 


Eos #  0.1  


 


Baso #  0.0  


 


Seg Neutrophils %  58.1  


 


Seg Neutrophils #  3.1  


 


PT   12.3 


 


INR   0.90 


 


APTT   24.3 


 


VBG pH   


 


Sodium    136 L


 


Potassium    4.3


 


Chloride    97.8 L


 


Carbon Dioxide    23


 


Anion Gap    20


 


BUN    8 L


 


Creatinine    0.9


 


Estimated GFR    > 60


 


BUN/Creatinine Ratio    9


 


Glucose    402 H


 


Calcium    8.8


 


Troponin T    < 0.010


 


Albumin/Globulin Ratio   














  07/21/20 07/21/20





  14:40 14:40


 


WBC  


 


RBC  


 


Hgb  


 


Hct  


 


MCV  


 


MCH  


 


MCHC  


 


RDW  


 


Plt Count  


 


Lymph % (Auto)  


 


Mono % (Auto)  


 


Eos % (Auto)  


 


Baso % (Auto)  


 


Lymph #  


 


Mono #  


 


Eos #  


 


Baso #  


 


Seg Neutrophils %  


 


Seg Neutrophils #  


 


PT  


 


INR  


 


APTT  


 


VBG pH   7.415


 


Sodium  


 


Potassium  


 


Chloride  


 


Carbon Dioxide  


 


Anion Gap  


 


BUN  


 


Creatinine  


 


Estimated GFR  


 


BUN/Creatinine Ratio  


 


Glucose  


 


Calcium  


 


Troponin T  


 


Albumin/Globulin Ratio  1.2 














- EKG Data


-: EKG Interpreted by Me


EKG shows normal: sinus rhythm, axis, intervals, QRS complexes, ST-T waves


Rate: normal





- EKG Data


Interpretation: no acute changes





- Radiology Data


Radiology results: report reviewed


Critical care attestation.: 


If time is entered above; I have spent that time in minutes in the direct care 

of this critically ill patient, excluding procedure time.








ED Disposition


Clinical Impression: 


 Atypical chest pain, Psychiatric disorder





Hyperglycemia due to type 2 diabetes mellitus


Qualifiers:


 Diabetes mellitus long term insulin use: with long term use Qualified Code(s): 

E11.65 - Type 2 diabetes mellitus with hyperglycemia; Z79.4 - Long term 

(current) use of insulin





Disposition: DC-01 TO HOME OR SELFCARE


Is pt being admited?: No


Does the pt Need Aspirin: No


Condition: Stable


Instructions:  Diabetes Mellitus Type 2 in Adults (ED), Chest Pain (ED)


Additional Instructions: 


Accu-Cheks.  Regular insulin with sliding scale.  [facility].  [facility] if you need further assistance.  Return to

 the emergency department any acute change or problem.


Prescriptions: 


Insulin Regular, Human [HumuLIN R] 1 units SQ BID #1 vial


Referrals: 


[facility], [place] [Primary Care Provider] - 24 Hours